# Patient Record
Sex: MALE | Race: WHITE | NOT HISPANIC OR LATINO | URBAN - METROPOLITAN AREA
[De-identification: names, ages, dates, MRNs, and addresses within clinical notes are randomized per-mention and may not be internally consistent; named-entity substitution may affect disease eponyms.]

---

## 2024-01-11 ENCOUNTER — OFFICE VISIT (OUTPATIENT)
Dept: PEDIATRICS | Facility: CLINIC | Age: 39
End: 2024-01-11
Payer: COMMERCIAL

## 2024-01-11 VITALS
SYSTOLIC BLOOD PRESSURE: 118 MMHG | WEIGHT: 200.13 LBS | HEIGHT: 71 IN | DIASTOLIC BLOOD PRESSURE: 89 MMHG | TEMPERATURE: 98 F | HEART RATE: 100 BPM | BODY MASS INDEX: 28.02 KG/M2

## 2024-01-11 DIAGNOSIS — Z71.84 TRAVEL ADVICE ENCOUNTER: Primary | ICD-10-CM

## 2024-01-11 PROCEDURE — 1160F RVW MEDS BY RX/DR IN RCRD: CPT | Mod: CPTII,S$GLB,, | Performed by: PEDIATRICS

## 2024-01-11 PROCEDURE — 90472 IMMUNIZATION ADMIN EACH ADD: CPT | Mod: S$GLB,,, | Performed by: PEDIATRICS

## 2024-01-11 PROCEDURE — 3079F DIAST BP 80-89 MM HG: CPT | Mod: CPTII,S$GLB,, | Performed by: PEDIATRICS

## 2024-01-11 PROCEDURE — 99204 OFFICE O/P NEW MOD 45 MIN: CPT | Mod: 25,S$GLB,, | Performed by: PEDIATRICS

## 2024-01-11 PROCEDURE — 90717 YELLOW FEVER VACCINE SUBQ: CPT | Mod: S$GLB,,, | Performed by: PEDIATRICS

## 2024-01-11 PROCEDURE — 90471 IMMUNIZATION ADMIN: CPT | Mod: S$GLB,,, | Performed by: PEDIATRICS

## 2024-01-11 PROCEDURE — 90691 TYPHOID VACCINE IM: CPT | Mod: S$GLB,,, | Performed by: PEDIATRICS

## 2024-01-11 PROCEDURE — 3008F BODY MASS INDEX DOCD: CPT | Mod: CPTII,S$GLB,, | Performed by: PEDIATRICS

## 2024-01-11 PROCEDURE — 99999 PR PBB SHADOW E&M-NEW PATIENT-LVL III: CPT | Mod: PBBFAC,,, | Performed by: PEDIATRICS

## 2024-01-11 PROCEDURE — 1159F MED LIST DOCD IN RCRD: CPT | Mod: CPTII,S$GLB,, | Performed by: PEDIATRICS

## 2024-01-11 PROCEDURE — 3074F SYST BP LT 130 MM HG: CPT | Mod: CPTII,S$GLB,, | Performed by: PEDIATRICS

## 2024-01-11 PROCEDURE — 90632 HEPA VACCINE ADULT IM: CPT | Mod: S$GLB,,, | Performed by: PEDIATRICS

## 2024-01-11 RX ORDER — CETIRIZINE HYDROCHLORIDE 10 MG/1
10 TABLET ORAL DAILY
COMMUNITY

## 2024-01-11 RX ORDER — DEXTROAMPHETAMINE SACCHARATE, AMPHETAMINE ASPARTATE, DEXTROAMPHETAMINE SULFATE AND AMPHETAMINE SULFATE 2.5; 2.5; 2.5; 2.5 MG/1; MG/1; MG/1; MG/1
1 TABLET ORAL 2 TIMES DAILY
COMMUNITY
Start: 2023-11-13

## 2024-01-11 NOTE — PATIENT INSTRUCTIONS
..Handy Bautista Perilloux, [place]  [facility]ediatric [facility] Adoles[facility]  [phone]      General Travel Tips   Learn about your destination- rural vs. urban, accommodations, food and water preparation, geography, i. e. elevation, jungle, etc., season of year, medical services available.     Schedule an office visit for a travel medical consultation.  Try to schedule this at least 6 weeks ahead of travel dates (not always possible).     3.  Obtain your current immunization record prior to the office visit.  If you have received your yellow fever vaccine, find your International Certificate of Vaccination     Dental, eye exam recommended, depending on length of travel.   - extra pair of glasses or contacts recommended.     Obtain appropriate amount of your prescription medicines for your travel duration.  Wear a medic alert bracelet for chronic conditions, i. e. diabetes, seizures and specific allergies.      Make sure you are covered by your health insurance for medical issues during your travel.  Many health insurance plans will not cover international travel.  One such provider is roundtrip trip insurance.  < [email or web address]   This is not an endorsement of this insurance provider.     Obtain a list of medical providers at your travel destinations (English speaking).     First aid travel kit:     Analgesics, i. e. Acetaminophen (Tylenol) or Ibuprofen (Motrin, Advil)   Antibacterial wipes and Hand , i. e. Purell   Antibiotic ointment, i. e. Polysporin, Triple Antibiotic   Anti- diarrhea caplets, i. e. Imodium, Pepto Bismol   Antihistamine, i. e. Benadryl   Antihistamine (less sedating), i. e. Zyrtec or Claritin   Bandaids and bandages, i. e. Ace wrap    Decongestant, i. e. Sudafed   Eye drops, i. e. Clear eyes, Visine   Gauze pads   Gentle laxative. i. e. Senekot or MOM    Hydrocortisone cream/ointment   Insect repellent   Lip protectants, i. e. Chapstick,  Blistex   Medical tape   Motion sickness tablets, i. e. dramamine   Tums       Prescription medicines to consider:   Bactroban ointment- topical antibacterial for superficial infections   Zofran- dissolving tablet for nausea and vomiting   * if prescription medicines are desired, discuss at travel consultation     9.  Obtain or find your passport, visa or other necessary identification papers.  Make sure they are up to date.  Bring a photocopy of your passport to carry with you, also.     10. Helpful links are below:  Travel Medicine of [place]: <[email or web address].pediatricsbr.com/international-travel-medicine/>    [place] Government Tips:  <http://travel.state.gov/travel/tips/tips_1232.html>    [facility] Travel Health:  <http://www.MOBITRAC/>    Center for Disease Control:  <http://wwwnc.cdc.gov/travel/>    WebMD: <http://emedicine.streamit.com/article/406524-opqhekhc>               [place]    VACCINES:  Routine Vaccines- routinely given through childhood with boosters in adulthood, i.e. Tetanus (Tdap), Measles, Mumps, Rubella (MMR), Polio, Hepatitis B, Meningococcal (MCV4), Varicella etc..  Many times before international travel a booster is needed.  Make sure you are up to date  Recommended Vaccines:  Hepatitis A (two shot series minimum six months apart), Typhoid (one injection)  Required Vaccines:  Yellow Fever is REQUIRED    MALARIA:  Malaria Prophylaxis- Not recommended by CDC   Protective Measures:   Use insecticide-impregnated mosquito nets while sleeping.   Remain in well-screened areas.   Wear protective clothing.   Use mosquito repellents containing DEET.    <http://www.cdc.gov/malaria/>    To prevent typhoid and GI illness food preparation is very important.  Protective Measures:   - Food preparation- Boil it, cook it, peel it or forget it  Bottled water, boil for 1 minute; bottled carbonated water noncarbonated.  Ice, popsicles, flavored ice only from boiled or bottled water  Thoroughly cooked  food, still hot and steaming.  Avoid raw vegetables that are not peeled or lettuce.   - Peel vegetables yourself (after washing hands), do not eat peelings.  Avoid foods, beverages bought from .      If you need any help, let me know

## 2024-01-11 NOTE — PROGRESS NOTES
"SUBJECTIVE:  Valeriano Lopes is a 38 y.o. male here accompanied by spouse, who is a historian.    HPI  Patient presents to the clinic with concerns about  traveling to [place] on 02/13/2024. Pt will be returning 12 days later. Pt is here to get Yellow fever vaccine and Hepatitis A vaccine before traveling.     Immunizations:      Valeriano's allergies, medications, history, and problem list were updated as appropriate.    Review of Systems  A comprehensive review of symptoms was completed and negative except as noted in the HPI.    OBJECTIVE:  Vital signs  Vitals:    01/11/24 1018   BP: 118/89   BP Location: Left arm   Patient Position: Sitting   BP Method: Medium (Automatic)   Pulse: 100   Temp: 97.8 °F (36.6 °C)   TempSrc: Oral   Weight: 90.8 kg (200 lb 1.6 oz)   Height: 5' 11" (1.803 m)        Physical Exam  Vitals reviewed.   Constitutional:       Appearance: Normal appearance.   HENT:      Right Ear: Tympanic membrane normal.      Left Ear: Tympanic membrane normal.      Nose: Nose normal.      Mouth/Throat:      Pharynx: Oropharynx is clear.   Eyes:      Conjunctiva/sclera: Conjunctivae normal.   Cardiovascular:      Rate and Rhythm: Normal rate and regular rhythm.      Heart sounds: Normal heart sounds. No murmur heard.     No friction rub. No gallop.   Pulmonary:      Breath sounds: Normal breath sounds.   Abdominal:      Palpations: Abdomen is soft.      Tenderness: There is no abdominal tenderness.   Musculoskeletal:         General: Normal range of motion.      Cervical back: Neck supple.   Skin:     Findings: No rash.   Neurological:      General: No focal deficit present.           ASSESSMENT/PLAN:  Valeriano was seen today for travel consult.    Diagnoses and all orders for this visit:    Travel advice encounter  -     Hepatitis A Vaccine (Pediatric/Adolescent) (2 Dose) (IM)  -     Yellow Fever Vaccine (SQ)  -     Typhoid Vaccine (ViCPs) (IM)      HO- Travel [place]    Handout provided  Follow instructions listed " on hand out for treatment  Call or return to clinic if worsens or does not resolve      No results found for this or any previous visit (from the past 672 hour(s)).    Age appropriate physical activity and nutritional counseling were completed during today's visit.     Follow Up:  No follow-ups on file.